# Patient Record
Sex: MALE | Race: WHITE | NOT HISPANIC OR LATINO | Employment: OTHER | URBAN - METROPOLITAN AREA
[De-identification: names, ages, dates, MRNs, and addresses within clinical notes are randomized per-mention and may not be internally consistent; named-entity substitution may affect disease eponyms.]

---

## 2017-01-12 ENCOUNTER — FOLLOW UP (OUTPATIENT)
Dept: URBAN - METROPOLITAN AREA CLINIC 26 | Facility: CLINIC | Age: 82
End: 2017-01-12

## 2017-01-12 VITALS
WEIGHT: 165 LBS | HEART RATE: 89 BPM | BODY MASS INDEX: 25.9 KG/M2 | HEIGHT: 67 IN | DIASTOLIC BLOOD PRESSURE: 77 MMHG | SYSTOLIC BLOOD PRESSURE: 137 MMHG

## 2017-01-12 DIAGNOSIS — H35.61: ICD-10-CM

## 2017-01-12 DIAGNOSIS — H35.3122: ICD-10-CM

## 2017-01-12 DIAGNOSIS — H35.3211: ICD-10-CM

## 2017-01-12 DIAGNOSIS — H43.813: ICD-10-CM

## 2017-01-12 DIAGNOSIS — H40.9: ICD-10-CM

## 2017-01-12 DIAGNOSIS — H35.371: ICD-10-CM

## 2017-01-12 PROCEDURE — G8417 CALC BMI ABV UP PARAM F/U: HCPCS

## 2017-01-12 PROCEDURE — G8427 DOCREV CUR MEDS BY ELIG CLIN: HCPCS

## 2017-01-12 PROCEDURE — 92250 FUNDUS PHOTOGRAPHY W/I&R: CPT

## 2017-01-12 PROCEDURE — 92235 FLUORESCEIN ANGRPH MLTIFRAME: CPT

## 2017-01-12 PROCEDURE — 92014 COMPRE OPH EXAM EST PT 1/>: CPT

## 2017-01-12 PROCEDURE — 1036F TOBACCO NON-USER: CPT

## 2017-01-12 PROCEDURE — 4177F TALK PT/CRGVR RE AREDS PREV: CPT

## 2017-01-12 PROCEDURE — 2019F DILATED MACUL EXAM DONE: CPT

## 2017-01-12 PROCEDURE — 67028 INJECTION EYE DRUG: CPT

## 2017-01-12 ASSESSMENT — VISUAL ACUITY
OS_SC: 20/25
OD_SC: 20/80-

## 2017-01-12 ASSESSMENT — TONOMETRY
OS_IOP_MMHG: 8
OD_IOP_MMHG: 11

## 2017-02-16 ENCOUNTER — PROCEDURE ONLY (OUTPATIENT)
Dept: URBAN - METROPOLITAN AREA CLINIC 26 | Facility: CLINIC | Age: 82
End: 2017-02-16

## 2017-02-16 DIAGNOSIS — H35.3122: ICD-10-CM

## 2017-02-16 DIAGNOSIS — H35.3211: ICD-10-CM

## 2017-02-16 PROCEDURE — 67028 INJECTION EYE DRUG: CPT

## 2017-02-16 PROCEDURE — 92134 CPTRZ OPH DX IMG PST SGM RTA: CPT

## 2017-02-16 ASSESSMENT — TONOMETRY
OS_IOP_MMHG: 14
OD_IOP_MMHG: 15

## 2017-02-16 ASSESSMENT — VISUAL ACUITY
OS_SC: 20/40+1
OD_SC: 20/100-1

## 2017-04-07 ENCOUNTER — PROCEDURE ONLY (OUTPATIENT)
Dept: URBAN - METROPOLITAN AREA CLINIC 26 | Facility: CLINIC | Age: 82
End: 2017-04-07

## 2017-04-07 DIAGNOSIS — H35.3211: ICD-10-CM

## 2017-04-07 DIAGNOSIS — H35.3122: ICD-10-CM

## 2017-04-07 PROCEDURE — 67028 INJECTION EYE DRUG: CPT

## 2017-04-07 PROCEDURE — 92134 CPTRZ OPH DX IMG PST SGM RTA: CPT

## 2017-04-07 ASSESSMENT — VISUAL ACUITY
OD_SC: 20/200
OS_SC: 20/30-2

## 2017-04-07 ASSESSMENT — TONOMETRY
OS_IOP_MMHG: 12
OD_IOP_MMHG: 16

## 2017-05-12 ENCOUNTER — FOLLOW UP AND POST INJECTION EVALUATION (OUTPATIENT)
Dept: URBAN - METROPOLITAN AREA CLINIC 26 | Facility: CLINIC | Age: 82
End: 2017-05-12

## 2017-05-12 VITALS — HEART RATE: 69 BPM | SYSTOLIC BLOOD PRESSURE: 132 MMHG | HEIGHT: 60 IN | DIASTOLIC BLOOD PRESSURE: 78 MMHG

## 2017-05-12 DIAGNOSIS — H35.3211: ICD-10-CM

## 2017-05-12 DIAGNOSIS — H43.813: ICD-10-CM

## 2017-05-12 DIAGNOSIS — H04.123: ICD-10-CM

## 2017-05-12 DIAGNOSIS — H35.61: ICD-10-CM

## 2017-05-12 DIAGNOSIS — H02.836: ICD-10-CM

## 2017-05-12 DIAGNOSIS — H35.371: ICD-10-CM

## 2017-05-12 DIAGNOSIS — H02.833: ICD-10-CM

## 2017-05-12 DIAGNOSIS — H40.9: ICD-10-CM

## 2017-05-12 DIAGNOSIS — H35.3122: ICD-10-CM

## 2017-05-12 PROCEDURE — 2019F DILATED MACUL EXAM DONE: CPT

## 2017-05-12 PROCEDURE — 92235 FLUORESCEIN ANGRPH MLTIFRAME: CPT

## 2017-05-12 PROCEDURE — 67028 INJECTION EYE DRUG: CPT

## 2017-05-12 PROCEDURE — 4177F TALK PT/CRGVR RE AREDS PREV: CPT

## 2017-05-12 PROCEDURE — 1036F TOBACCO NON-USER: CPT

## 2017-05-12 PROCEDURE — 92014 COMPRE OPH EXAM EST PT 1/>: CPT

## 2017-05-12 PROCEDURE — G8427 DOCREV CUR MEDS BY ELIG CLIN: HCPCS

## 2017-05-12 PROCEDURE — 92250 FUNDUS PHOTOGRAPHY W/I&R: CPT

## 2017-05-12 PROCEDURE — G8417 CALC BMI ABV UP PARAM F/U: HCPCS

## 2017-05-12 ASSESSMENT — VISUAL ACUITY
OS_CC: 20/40+2
OD_CC: 20/200

## 2017-05-12 ASSESSMENT — TONOMETRY
OD_IOP_MMHG: 12
OS_IOP_MMHG: 16

## 2017-06-23 ENCOUNTER — FOLLOW UP AND POST INJECTION EVALUATION (OUTPATIENT)
Dept: URBAN - METROPOLITAN AREA CLINIC 26 | Facility: CLINIC | Age: 82
End: 2017-06-23

## 2017-06-23 VITALS — HEART RATE: 96 BPM | HEIGHT: 60 IN | DIASTOLIC BLOOD PRESSURE: 71 MMHG | SYSTOLIC BLOOD PRESSURE: 98 MMHG

## 2017-06-23 DIAGNOSIS — H35.3211: ICD-10-CM

## 2017-06-23 DIAGNOSIS — H35.3122: ICD-10-CM

## 2017-06-23 PROCEDURE — 67028 INJECTION EYE DRUG: CPT

## 2017-06-23 PROCEDURE — 92134 CPTRZ OPH DX IMG PST SGM RTA: CPT

## 2017-06-23 ASSESSMENT — TONOMETRY
OD_IOP_MMHG: 12
OS_IOP_MMHG: 15

## 2017-06-23 ASSESSMENT — VISUAL ACUITY
OS_SC: 20/30+2
OD_SC: 20/100+1

## 2017-07-12 ENCOUNTER — FOLLOW UP AND POST INJECTION EVALUATION (OUTPATIENT)
Dept: URBAN - METROPOLITAN AREA CLINIC 33 | Facility: CLINIC | Age: 82
End: 2017-07-12

## 2022-07-09 ENCOUNTER — TELEPHONE ENCOUNTER (OUTPATIENT)
Dept: URBAN - METROPOLITAN AREA CLINIC 121 | Facility: CLINIC | Age: 87
End: 2022-07-09

## 2022-07-09 RX ORDER — HYDROCHLOROTHIAZIDE 25 MG/1
TABLET ORAL
Refills: 0 | OUTPATIENT
Start: 2011-09-19 | End: 2015-03-19

## 2022-07-09 RX ORDER — PROBENECID 500 MG
TABLET ORAL
Refills: 0 | OUTPATIENT
Start: 2015-03-19 | End: 2016-08-31

## 2022-07-09 RX ORDER — ASPIRIN 81 MG/1
ONE TAB EVERY OTHER DAY TABLET, DELAYED RELEASE ORAL
Refills: 0 | OUTPATIENT
Start: 2016-08-31 | End: 2016-01-01

## 2022-07-09 RX ORDER — SULCONAZOLE NITRATE 10 MG/G
CREAM TOPICAL
Refills: 0 | OUTPATIENT
Start: 2015-03-19 | End: 2016-08-31

## 2022-07-09 RX ORDER — SULCONAZOLE NITRATE 10 MG/G
CREAM TOPICAL ONCE A DAY
Refills: 0 | OUTPATIENT
Start: 2016-08-31 | End: 2016-01-01

## 2022-07-09 RX ORDER — LOSARTAN POTASSIUM 50 MG/1
TABLET, FILM COATED ORAL
Refills: 0 | OUTPATIENT
Start: 2015-03-19 | End: 2016-08-31

## 2022-07-09 RX ORDER — PROBENECID 500 MG
TABLET ORAL ONCE A DAY
Refills: 0 | OUTPATIENT
Start: 2016-08-31 | End: 2016-01-01

## 2022-07-09 RX ORDER — TRAVOPROST 0.04 MG/ML
SOLUTION/ DROPS OPHTHALMIC ONCE A DAY
Refills: 0 | OUTPATIENT
Start: 2016-01-01 | End: 2016-01-01

## 2022-07-09 RX ORDER — LOSARTAN POTASSIUM 50 MG/1
TABLET, FILM COATED ORAL ONCE A DAY
Refills: 0 | OUTPATIENT
Start: 2016-08-31 | End: 2016-01-01

## 2022-07-09 RX ORDER — BETAMETHASONE VALERATE 1 MG/ML
LOTION CUTANEOUS
Refills: 0 | OUTPATIENT
Start: 2011-09-19 | End: 2015-03-19

## 2022-07-09 RX ORDER — TRAVOPROST 0.04 MG/ML
SOLUTION/ DROPS OPHTHALMIC ONCE A DAY
Refills: 0 | OUTPATIENT
Start: 2016-08-31 | End: 2016-01-01

## 2022-07-09 RX ORDER — ASPIRIN 81 MG/1
TABLET, COATED ORAL
Refills: 0 | OUTPATIENT
Start: 2015-03-19 | End: 2016-08-31

## 2022-07-09 RX ORDER — ASPIRIN 81 MG/1
ONE TAB EVERY OTHER DAY TABLET, DELAYED RELEASE ORAL
Refills: 0 | OUTPATIENT
Start: 2016-01-01 | End: 2016-01-01

## 2022-07-09 RX ORDER — DOXAZOSIN MESYLATE 4 MG/1
TABLET ORAL
Refills: 0 | OUTPATIENT
Start: 2015-03-19 | End: 2016-08-31

## 2022-07-09 RX ORDER — ASPIRIN 81 MG/1
TABLET, COATED ORAL
Refills: 0 | OUTPATIENT
Start: 2011-09-19 | End: 2015-03-19

## 2022-07-09 RX ORDER — DOXAZOSIN MESYLATE 4 MG/1
TABLET ORAL ONCE A DAY
Refills: 0 | OUTPATIENT
Start: 2016-08-31 | End: 2016-08-31

## 2022-07-09 RX ORDER — TRAVOPROST 0.04 MG/ML
SOLUTION/ DROPS OPHTHALMIC
Refills: 0 | OUTPATIENT
Start: 2011-09-19 | End: 2015-03-19

## 2022-07-09 RX ORDER — IBUPROFEN 200 MG/1
TABLET, FILM COATED ORAL
Refills: 0 | OUTPATIENT
Start: 2011-09-19 | End: 2015-03-19

## 2022-07-09 RX ORDER — DOXAZOSIN MESYLATE 4 MG/1
TABLET ORAL
Refills: 0 | OUTPATIENT
Start: 2011-09-19 | End: 2015-03-19

## 2022-07-09 RX ORDER — TRAVOPROST 0.04 MG/ML
SOLUTION/ DROPS OPHTHALMIC
Refills: 0 | OUTPATIENT
Start: 2015-03-19 | End: 2016-08-31

## 2022-07-09 RX ORDER — LOSARTAN POTASSIUM 100 MG/1
TABLET, FILM COATED ORAL
Refills: 0 | OUTPATIENT
Start: 2011-09-19 | End: 2015-03-19

## 2022-07-09 RX ORDER — PROBENECID 500 MG
TABLET ORAL
Refills: 0 | OUTPATIENT
Start: 2011-09-19 | End: 2015-03-19

## 2022-07-09 RX ORDER — DOXAZOSIN MESYLATE 4 MG/1
TABLET ORAL ONCE A DAY
Refills: 0 | OUTPATIENT
Start: 2016-08-31 | End: 2016-01-01

## 2022-07-10 ENCOUNTER — TELEPHONE ENCOUNTER (OUTPATIENT)
Dept: URBAN - METROPOLITAN AREA CLINIC 121 | Facility: CLINIC | Age: 87
End: 2022-07-10

## 2022-07-10 RX ORDER — SULCONAZOLE NITRATE 10 MG/G
CREAM TOPICAL ONCE A DAY
Refills: 0 | Status: ACTIVE | COMMUNITY
Start: 2016-01-01

## 2022-07-10 RX ORDER — DOXAZOSIN MESYLATE 4 MG/1
TABLET ORAL ONCE A DAY
Refills: 0 | Status: ACTIVE | COMMUNITY
Start: 2016-01-01

## 2022-07-10 RX ORDER — ASPIRIN 81 MG/1
ONE TAB EVERY OTHER DAY TABLET, DELAYED RELEASE ORAL ONCE A DAY
Refills: 0 | Status: ACTIVE | COMMUNITY
Start: 2016-01-01

## 2022-07-10 RX ORDER — PROBENECID 500 MG
TABLET ORAL ONCE A DAY
Refills: 0 | Status: ACTIVE | COMMUNITY
Start: 2016-01-01

## 2022-07-10 RX ORDER — TETRACYCLINE HYDROCHLORIDE 500 MG/1
CAPSULE ORAL ONCE A DAY
Refills: 0 | Status: ACTIVE | COMMUNITY
Start: 2016-01-01

## 2022-07-10 RX ORDER — LOSARTAN POTASSIUM 50 MG/1
TABLET, FILM COATED ORAL ONCE A DAY
Refills: 0 | Status: ACTIVE | COMMUNITY
Start: 2016-01-01

## 2022-07-10 RX ORDER — TRAVOPROST 0.04 MG/ML
SOLUTION/ DROPS OPHTHALMIC ONCE A DAY
Refills: 0 | Status: ACTIVE | COMMUNITY
Start: 2016-01-01

## 2022-10-27 NOTE — PATIENT DISCUSSION
POSTERIOR CAPSULAR FIBROSIS, OU:  VISUALLY SIGNIFICANT. OPTION OF YAG LASER VERSUS UPDATING GLASSES VERSUS FOLLOWING DISCUSSED. RBA'S DISCUSSED, PATIENT UNDERSTANDS AND DESIRES YAG LASER TO INCREASE VISION FOR READING AND DRIVING AT NIGHT BY REDUCING GLARE.   SCHEDULE YAG LASER OS THEN OD Unit RN to OR RN Unit RN to OR RN/Holding RN to OR RN